# Patient Record
Sex: MALE | Race: WHITE
[De-identification: names, ages, dates, MRNs, and addresses within clinical notes are randomized per-mention and may not be internally consistent; named-entity substitution may affect disease eponyms.]

---

## 2018-04-12 ENCOUNTER — HOSPITAL ENCOUNTER (OUTPATIENT)
Dept: HOSPITAL 53 - M LABNEURO | Age: 58
End: 2018-04-12
Attending: PSYCHIATRY & NEUROLOGY
Payer: MEDICARE

## 2018-04-12 DIAGNOSIS — G40.A09: ICD-10-CM

## 2018-04-12 DIAGNOSIS — G40.309: Primary | ICD-10-CM

## 2018-04-12 LAB
ALBUMIN/GLOBULIN RATIO: 1.09 (ref 1–1.93)
ALBUMIN: 3.7 GM/DL (ref 3.2–5.2)
ALKALINE PHOSPHATASE: 56 U/L (ref 45–117)
ALT SERPL W P-5'-P-CCNC: 49 U/L (ref 12–78)
ANION GAP: 4 MEQ/L (ref 8–16)
AST SERPL-CCNC: 38 U/L (ref 7–37)
BASO #: 0 10^3/UL (ref 0–0.2)
BASO %: 0.5 % (ref 0–1)
BILIRUBIN,TOTAL: 0.5 MG/DL (ref 0.2–1)
BLOOD UREA NITROGEN: 27 MG/DL (ref 7–18)
CALCIUM LEVEL: 9.7 MG/DL (ref 8.5–10.1)
CARBON DIOXIDE LEVEL: 29 MEQ/L (ref 21–32)
CHLORIDE LEVEL: 106 MEQ/L (ref 98–107)
CREATININE FOR GFR: 1.09 MG/DL (ref 0.7–1.3)
EOS #: 0.1 10^3/UL (ref 0–0.5)
EOSINOPHIL NFR BLD AUTO: 0.8 % (ref 0–3)
GFR SERPL CREATININE-BSD FRML MDRD: > 60 ML/MIN/{1.73_M2} (ref 56–?)
GLUCOSE, FASTING: 90 MG/DL (ref 70–100)
HEMATOCRIT: 43.4 % (ref 42–52)
HEMOGLOBIN: 14.5 G/DL (ref 13.5–17.5)
IMMATURE GRANULOCYTE %: 0.8 % (ref 0–3)
LYMPH #: 2 10^3/UL (ref 1.5–4.5)
LYMPH %: 27.6 % (ref 24–44)
MEAN CORPUSCULAR HEMOGLOBIN: 31.5 PG (ref 27–33)
MEAN CORPUSCULAR HGB CONC: 33.4 G/DL (ref 32–36.5)
MEAN CORPUSCULAR VOLUME: 94.1 FL (ref 80–96)
MONO #: 0.7 10^3/UL (ref 0–0.8)
MONO %: 8.9 % (ref 0–5)
NEUTROPHILS #: 4.5 10^3/UL (ref 1.8–7.7)
NEUTROPHILS %: 61.4 % (ref 36–66)
NRBC BLD AUTO-RTO: 0 % (ref 0–0)
PLATELET COUNT, AUTOMATED: 240 10^3/UL (ref 150–450)
POTASSIUM SERUM: 4.6 MEQ/L (ref 3.5–5.1)
RED BLOOD COUNT: 4.61 10^6/UL (ref 4.3–6.1)
RED CELL DISTRIBUTION WIDTH: 12.4 % (ref 11.5–14.5)
SODIUM LEVEL: 139 MEQ/L (ref 136–145)
TOTAL PROTEIN: 7.1 GM/DL (ref 6.4–8.2)
VALPROIC ACID (DEPAKOTE): 74.7 UG/ML (ref 50–100)
WHITE BLOOD COUNT: 7.3 10^3/UL (ref 4–10)

## 2018-04-12 PROCEDURE — 80164 ASSAY DIPROPYLACETIC ACD TOT: CPT

## 2018-04-17 LAB — LAMOTRIGINE SERPL-MCNC: 5.1 UG/ML (ref 2–20)

## 2019-01-03 ENCOUNTER — HOSPITAL ENCOUNTER (EMERGENCY)
Dept: HOSPITAL 53 - M ED | Age: 59
Discharge: HOME | End: 2019-01-03
Payer: MEDICARE

## 2019-01-03 VITALS — WEIGHT: 179.68 LBS | BODY MASS INDEX: 29.94 KG/M2 | HEIGHT: 65 IN

## 2019-01-03 VITALS — SYSTOLIC BLOOD PRESSURE: 172 MMHG | DIASTOLIC BLOOD PRESSURE: 79 MMHG

## 2019-01-03 DIAGNOSIS — E11.9: ICD-10-CM
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
## 2019-01-03 DIAGNOSIS — M47.812: ICD-10-CM
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
## 2019-01-03 DIAGNOSIS — I10: ICD-10-CM

## 2019-01-03 DIAGNOSIS — S06.0X9A: Primary | ICD-10-CM

## 2019-01-03 DIAGNOSIS — Z79.899: ICD-10-CM

## 2019-01-03 DIAGNOSIS — R56.9: ICD-10-CM
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
## 2019-01-03 PROCEDURE — 99283 EMERGENCY DEPT VISIT LOW MDM: CPT

## 2019-01-03 PROCEDURE — 72125 CT NECK SPINE W/O DYE: CPT

## 2019-01-03 PROCEDURE — 70450 CT HEAD/BRAIN W/O DYE: CPT

## 2019-01-03 NOTE — REP
CT cervical spine without contrast

 

HISTORY: Injury

 

COMPARISON: None

 

There is no acute fracture or subluxation. Disc bulges are present at the C2-3

and C6-7 levels.  Disc bulges with associated osteophyte formation are present at

the C3-4 through C5-6 levels.  There is minimal narrowing of the spinal canal.

Uncinate process and/or facet hypertrophy are present at the C2-3 through C6-7

levels.  These findings produce minimal to mild narrowing of the neural foramina.

C3-4, C5-6 and C6-7 intervertebral discs are decreased in height consistent with

disc degeneration.

 

IMPRESSION:

1. There is no acute fracture or subluxation.

2.  There is cervical spondylosis at the C2-3 through C6-7 levels.

 

 

Electronically Signed by

Keith Bae MD 01/03/2019 09:35 A

## 2019-01-03 NOTE — REP
CT Head without contrast

 

HISTORY:  Injury

 

COMPARISON: None

 

Areas of decreased attenuation are present in the periventricular white matter.

This represents small-vessel ischemic disease.  There is no intraparenchymal

hemorrhage, acute infarct,  mass or midline shift.  The ventricular system and

cortical sulci as well as subarachnoid space in the posterior fossa are dilated

consistent with mild volume loss. There is no extra cerebral collection.  There

is no fracture.  The visualized sinuses are clear. Soft tissue swelling is

present overlying the parietal bones at the vertex.

 

IMPRESSION:

1.  Small vessel ischemic disease.

2.  Mild volume loss.

 

 

 

 

Electronically Signed by

Keith Bae MD 01/03/2019 09:26 A

## 2019-08-21 ENCOUNTER — HOSPITAL ENCOUNTER (OUTPATIENT)
Dept: HOSPITAL 53 - M LABNEURO | Age: 59
End: 2019-08-21
Attending: PSYCHIATRY & NEUROLOGY
Payer: MEDICARE

## 2019-08-21 DIAGNOSIS — D51.9: ICD-10-CM

## 2019-08-21 DIAGNOSIS — E11.40: Primary | ICD-10-CM

## 2019-08-21 DIAGNOSIS — E83.01: ICD-10-CM

## 2019-08-21 LAB
ALBUMIN SERPL BCG-MCNC: 3.7 GM/DL (ref 3.2–5.2)
ALT SERPL W P-5'-P-CCNC: 36 U/L (ref 12–78)
BASOPHILS # BLD AUTO: 0.1 10^3/UL (ref 0–0.2)
BASOPHILS NFR BLD AUTO: 0.9 % (ref 0–1)
BILIRUB SERPL-MCNC: 0.3 MG/DL (ref 0.2–1)
BUN SERPL-MCNC: 21 MG/DL (ref 7–18)
CALCIUM SERPL-MCNC: 9.5 MG/DL (ref 8.5–10.1)
CHLORIDE SERPL-SCNC: 105 MEQ/L (ref 98–107)
CO2 SERPL-SCNC: 28 MEQ/L (ref 21–32)
CREAT SERPL-MCNC: 1.12 MG/DL (ref 0.7–1.3)
EOSINOPHIL # BLD AUTO: 0.2 10^3/UL (ref 0–0.5)
EOSINOPHIL NFR BLD AUTO: 2.2 % (ref 0–3)
EST. AVERAGE GLUCOSE BLD GHB EST-MCNC: 128 MG/DL (ref 60–110)
FOLATE SERPL-MCNC: 20.5 NG/ML
GFR SERPL CREATININE-BSD FRML MDRD: > 60 ML/MIN/{1.73_M2} (ref 56–?)
GLUCOSE SERPL-MCNC: 117 MG/DL (ref 70–100)
HCT VFR BLD AUTO: 47.8 % (ref 42–52)
HGB BLD-MCNC: 15.5 G/DL (ref 13.5–17.5)
LYMPHOCYTES # BLD AUTO: 1.5 10^3/UL (ref 1.5–4.5)
LYMPHOCYTES NFR BLD AUTO: 19.1 % (ref 24–44)
MCH RBC QN AUTO: 30 PG (ref 27–33)
MCHC RBC AUTO-ENTMCNC: 32.4 G/DL (ref 32–36.5)
MCV RBC AUTO: 92.6 FL (ref 80–96)
MONOCYTES # BLD AUTO: 0.7 10^3/UL (ref 0–0.8)
MONOCYTES NFR BLD AUTO: 8.2 % (ref 0–5)
NEUTROPHILS # BLD AUTO: 5.6 10^3/UL (ref 1.8–7.7)
NEUTROPHILS NFR BLD AUTO: 68.9 % (ref 36–66)
PLATELET # BLD AUTO: 273 10^3/UL (ref 150–450)
POTASSIUM SERPL-SCNC: 4.6 MEQ/L (ref 3.5–5.1)
PROT SERPL-MCNC: 6.9 GM/DL (ref 6.4–8.2)
RBC # BLD AUTO: 5.16 10^6/UL (ref 4.3–6.1)
SODIUM SERPL-SCNC: 139 MEQ/L (ref 136–145)
VALPROATE SERPL-MCNC: 72.2 UG/ML (ref 50–100)
VIT B12 SERPL-MCNC: 618 PG/ML
WBC # BLD AUTO: 8.1 10^3/UL (ref 4–10)

## 2019-08-22 LAB
ALBUMIN MFR UR ELPH: 60.1 % (ref 55.8–66.1)
ALBUMIN SERPL-MCNC: 4.15 GM/DL (ref 3.29–5.55)
ALPHA1 GLOB MFR SERPL ELPH: 3.8 % (ref 2.9–4.9)
ALPHA1 GLOB SERPL ELPH-MCNC: 0.26 GM/DL (ref 0.17–0.41)
ALPHA2 GLOB SERPL ELPH-MCNC: 0.58 GM/DL (ref 0.42–0.99)
ALPHA2 GLOB SERPL ELPH-MCNC: 8.4 % (ref 7.1–11.8)
B-GLOBULIN SERPL ELPH-MCNC: 0.45 GM/DL (ref 0.28–0.6)
BETA1 GLOB MFR SERPL ELPH: 6.5 % (ref 4.7–7.2)
BETA2 GLOB MFR SERPL ELPH: 6.1 % (ref 3.2–6.5)
BETA2 GLOB SERPL ELPH-MCNC: 0.42 GM/DL (ref 0.19–0.55)
GAMMA GLOB 24H MFR UR ELPH: 15.1 % (ref 11.1–18.8)
GAMMA GLOB SERPL ELPH-MCNC: 1.04 GM/DL (ref 0.65–1.58)
PROT PATTERN SERPL ELPH-IMP: (no result)

## 2019-08-27 LAB
A-TOCOPHEROL VIT E SERPL-MCNC: 15.2 MG/L (ref 7–25.1)
CERULOPLASMIN SERPL-MCNC: 19.5 MG/DL (ref 16–31)
COPPER SERPL-MCNC: 92 UG/DL (ref 72–166)
GAMMA TOCOPHEROL SERPL-MCNC: 2.7 MG/L (ref 0.5–5.5)
LAMOTRIGINE SERPL-MCNC: 5.1 UG/ML (ref 2–20)
PYRIDOXAL PHOS SERPL-MCNC: 6.6 UG/L (ref 5.3–46.7)
VIT B1 BLD-SCNC: 186 NMOL/L (ref 66.5–200)